# Patient Record
Sex: MALE | Race: WHITE | NOT HISPANIC OR LATINO | Employment: FULL TIME | URBAN - METROPOLITAN AREA
[De-identification: names, ages, dates, MRNs, and addresses within clinical notes are randomized per-mention and may not be internally consistent; named-entity substitution may affect disease eponyms.]

---

## 2022-04-12 ENCOUNTER — OFFICE VISIT (OUTPATIENT)
Dept: SURGERY | Facility: CLINIC | Age: 57
End: 2022-04-12
Payer: MEDICAID

## 2022-04-12 VITALS
WEIGHT: 221.69 LBS | BODY MASS INDEX: 28.45 KG/M2 | SYSTOLIC BLOOD PRESSURE: 149 MMHG | HEART RATE: 69 BPM | HEIGHT: 74 IN | DIASTOLIC BLOOD PRESSURE: 83 MMHG

## 2022-04-12 DIAGNOSIS — K40.90 RIGHT INGUINAL HERNIA: ICD-10-CM

## 2022-04-12 DIAGNOSIS — Z01.818 PRE-OP TESTING: ICD-10-CM

## 2022-04-12 PROCEDURE — 99203 OFFICE O/P NEW LOW 30 MIN: CPT | Mod: S$PBB,,, | Performed by: SURGERY

## 2022-04-12 PROCEDURE — 1160F RVW MEDS BY RX/DR IN RCRD: CPT | Mod: CPTII,,, | Performed by: SURGERY

## 2022-04-12 PROCEDURE — 99203 OFFICE O/P NEW LOW 30 MIN: CPT | Mod: PBBFAC | Performed by: SURGERY

## 2022-04-12 PROCEDURE — 3079F DIAST BP 80-89 MM HG: CPT | Mod: CPTII,,, | Performed by: SURGERY

## 2022-04-12 PROCEDURE — 3077F PR MOST RECENT SYSTOLIC BLOOD PRESSURE >= 140 MM HG: ICD-10-PCS | Mod: CPTII,,, | Performed by: SURGERY

## 2022-04-12 PROCEDURE — 3008F BODY MASS INDEX DOCD: CPT | Mod: CPTII,,, | Performed by: SURGERY

## 2022-04-12 PROCEDURE — 99999 PR PBB SHADOW E&M-NEW PATIENT-LVL III: CPT | Mod: PBBFAC,,, | Performed by: SURGERY

## 2022-04-12 PROCEDURE — 1159F PR MEDICATION LIST DOCUMENTED IN MEDICAL RECORD: ICD-10-PCS | Mod: CPTII,,, | Performed by: SURGERY

## 2022-04-12 PROCEDURE — 3079F PR MOST RECENT DIASTOLIC BLOOD PRESSURE 80-89 MM HG: ICD-10-PCS | Mod: CPTII,,, | Performed by: SURGERY

## 2022-04-12 PROCEDURE — 3008F PR BODY MASS INDEX (BMI) DOCUMENTED: ICD-10-PCS | Mod: CPTII,,, | Performed by: SURGERY

## 2022-04-12 PROCEDURE — 99999 PR PBB SHADOW E&M-NEW PATIENT-LVL III: ICD-10-PCS | Mod: PBBFAC,,, | Performed by: SURGERY

## 2022-04-12 PROCEDURE — 99203 PR OFFICE/OUTPT VISIT, NEW, LEVL III, 30-44 MIN: ICD-10-PCS | Mod: S$PBB,,, | Performed by: SURGERY

## 2022-04-12 PROCEDURE — 1159F MED LIST DOCD IN RCRD: CPT | Mod: CPTII,,, | Performed by: SURGERY

## 2022-04-12 PROCEDURE — 3077F SYST BP >= 140 MM HG: CPT | Mod: CPTII,,, | Performed by: SURGERY

## 2022-04-12 PROCEDURE — 1160F PR REVIEW ALL MEDS BY PRESCRIBER/CLIN PHARMACIST DOCUMENTED: ICD-10-PCS | Mod: CPTII,,, | Performed by: SURGERY

## 2022-04-12 NOTE — PROGRESS NOTES
"History & Physical    SUBJECTIVE:     History of Present Illness:  Patient is a 56 y.o. male presents with [facility] s/p fitz mcdowell with mesh 2013 by me.  He noticed it about 8 months ago after a sneezing episode.  He has symptoms when he sits and the area is squeezed-it is a discomfort.    He paints cars.    No chief complaint on file.      Review of patient's allergies indicates:   Allergen Reactions    Hydrocodone-acetaminophen Swelling     Swelling of arms and legs   rash  Swelling of arms and legs   rash       No current outpatient medications on file.     No current facility-administered medications for this visit.       No past medical history on file.  Past Surgical History:   Procedure Laterality Date    HERNIA REPAIR       Family History   Problem Relation Age of Onset    Heart disease Father     Diabetes Father     Heart disease Paternal Uncle      Social History     Tobacco Use    Smoking status: Current Every Day Smoker     Packs/day: 0.50    Smokeless tobacco: Never Used   Substance Use Topics    Alcohol use: Yes     Comment: drinks with fishing on the weekends    Drug use: No        Review of Systems:  Review of Systems   Constitutional: Negative for fever.   Respiratory: Negative for cough, chest tightness and shortness of breath.    Cardiovascular: Negative for chest pain.   Gastrointestinal: Negative for constipation, diarrhea, nausea and vomiting.   Genitourinary: Negative for difficulty urinating.   Hematological: Does not bruise/bleed easily.       OBJECTIVE:     Vital Signs (Most Recent)  Pulse: 69 (04/12/22 0912)  BP: (!) 149/83 (04/12/22 0912)  6' 2" (1.88 m)  100.5 kg (221 lb 10.8 oz)     Physical Exam:  Physical Exam  Vitals reviewed.   Constitutional:       Appearance: Normal appearance.   Abdominal:      Hernia: A hernia is present. Hernia is present in the right inguinal area.   Genitourinary:     Testes: Normal.       Neurological:      General: No focal deficit present.      Mental " Status: He is alert and oriented to person, place, and time.   Psychiatric:         Mood and Affect: Mood normal.         Behavior: Behavior normal.         Thought Content: Thought content normal.         Judgment: Judgment normal.         Laboratory  CBC: Reviewed  BMP: Reviewed  CMP: Reviewed  basically ok    Diagnostic Results:  Ct 2022 reviewed, rih with bladder  Stress and ekg 2021 reviewed, ok    ASSESSMENT/PLAN:     S/p lih years ago and now symptomatic rih.    PLAN:       Open rih with mesh.  Likely there is no light duty at work and he will be out of work for 6 weeks.

## 2022-05-10 ENCOUNTER — LAB VISIT (OUTPATIENT)
Dept: SURGERY | Facility: CLINIC | Age: 57
End: 2022-05-10
Payer: COMMERCIAL

## 2022-05-10 DIAGNOSIS — Z01.818 PRE-OP TESTING: ICD-10-CM

## 2022-05-10 LAB
SARS-COV-2 RNA RESP QL NAA+PROBE: NOT DETECTED
SARS-COV-2- CYCLE NUMBER: NORMAL

## 2022-05-10 PROCEDURE — U0003 INFECTIOUS AGENT DETECTION BY NUCLEIC ACID (DNA OR RNA); SEVERE ACUTE RESPIRATORY SYNDROME CORONAVIRUS 2 (SARS-COV-2) (CORONAVIRUS DISEASE [COVID-19]), AMPLIFIED PROBE TECHNIQUE, MAKING USE OF HIGH THROUGHPUT TECHNOLOGIES AS DESCRIBED BY CMS-2020-01-R: HCPCS | Performed by: SURGERY

## 2022-05-10 PROCEDURE — U0005 INFEC AGEN DETEC AMPLI PROBE: HCPCS | Performed by: SURGERY

## 2022-05-12 ENCOUNTER — TELEPHONE (OUTPATIENT)
Dept: SURGERY | Facility: CLINIC | Age: 57
End: 2022-05-12
Payer: COMMERCIAL

## 2022-05-12 NOTE — PRE-PROCEDURE INSTRUCTIONS
PreOp Instructions given:   - Verbal medication information (what to hold and what to take)   - NPO guidelines   - Arrival place directions given; time to be given the day before procedure by the   Surgeon's Office [id number] DOS  - Bathing with antibacterial soap   - Don't wear any jewelry or bring any valuables AM of surgery   - No makeup or moisturizer to face   - No perfume/cologne, powder, lotions or aftershave   Pt. verbalized understanding.   Pt denies any h/o Anesthesia/Sedation complications or side effects.

## 2022-05-13 ENCOUNTER — HOSPITAL ENCOUNTER (OUTPATIENT)
Facility: HOSPITAL | Age: 57
Discharge: HOME | End: 2022-05-13
Attending: SURGERY | Admitting: SURGERY
Payer: COMMERCIAL

## 2022-05-13 ENCOUNTER — ANESTHESIA (OUTPATIENT)
Dept: SURGERY | Facility: HOSPITAL | Age: 57
End: 2022-05-13
Payer: COMMERCIAL

## 2022-05-13 ENCOUNTER — ANESTHESIA EVENT (OUTPATIENT)
Dept: SURGERY | Facility: HOSPITAL | Age: 57
End: 2022-05-13
Payer: COMMERCIAL

## 2022-05-13 VITALS
OXYGEN SATURATION: 99 % | DIASTOLIC BLOOD PRESSURE: 74 MMHG | WEIGHT: 220 LBS | BODY MASS INDEX: 28.23 KG/M2 | TEMPERATURE: 98 F | RESPIRATION RATE: 21 BRPM | SYSTOLIC BLOOD PRESSURE: 117 MMHG | HEART RATE: 42 BPM | HEIGHT: 74 IN

## 2022-05-13 DIAGNOSIS — K40.90 INGUINAL HERNIA: ICD-10-CM

## 2022-05-13 DIAGNOSIS — K40.90 RIGHT INGUINAL HERNIA: Primary | ICD-10-CM

## 2022-05-13 PROCEDURE — D9220A PRA ANESTHESIA: Mod: ,,, | Performed by: ANESTHESIOLOGY

## 2022-05-13 PROCEDURE — 00830 ANES HERNIA RPR LWR ABD NOS: CPT | Performed by: SURGERY

## 2022-05-13 PROCEDURE — 71000015 HC POSTOP RECOV 1ST HR: Performed by: SURGERY

## 2022-05-13 PROCEDURE — 37000009 HC ANESTHESIA EA ADD 15 MINS: Performed by: SURGERY

## 2022-05-13 PROCEDURE — 64450: ICD-10-PCS | Mod: 59,RT,, | Performed by: ANESTHESIOLOGY

## 2022-05-13 PROCEDURE — 37000008 HC ANESTHESIA 1ST 15 MINUTES: Performed by: SURGERY

## 2022-05-13 PROCEDURE — 36000707: Performed by: SURGERY

## 2022-05-13 PROCEDURE — 49505 PRP I/HERN INIT REDUC >5 YR: CPT | Mod: RT,,, | Performed by: SURGERY

## 2022-05-13 PROCEDURE — 25000003 PHARM REV CODE 250: Performed by: SURGERY

## 2022-05-13 PROCEDURE — 63600175 PHARM REV CODE 636 W HCPCS: Performed by: STUDENT IN AN ORGANIZED HEALTH CARE EDUCATION/TRAINING PROGRAM

## 2022-05-13 PROCEDURE — 76942 ECHO GUIDE FOR BIOPSY: CPT | Mod: 26,,, | Performed by: ANESTHESIOLOGY

## 2022-05-13 PROCEDURE — 25000003 PHARM REV CODE 250: Performed by: STUDENT IN AN ORGANIZED HEALTH CARE EDUCATION/TRAINING PROGRAM

## 2022-05-13 PROCEDURE — C1729 CATH, DRAINAGE: HCPCS | Performed by: SURGERY

## 2022-05-13 PROCEDURE — 49505 PR REPAIR ING HERNIA,5+Y/O,REDUCIBL: ICD-10-PCS | Mod: RT,,, | Performed by: SURGERY

## 2022-05-13 PROCEDURE — 64450 NJX AA&/STRD OTHER PN/BRANCH: CPT | Performed by: ANESTHESIOLOGY

## 2022-05-13 PROCEDURE — D9220A PRA ANESTHESIA: ICD-10-PCS | Mod: ,,, | Performed by: ANESTHESIOLOGY

## 2022-05-13 PROCEDURE — 71000044 HC DOSC ROUTINE RECOVERY FIRST HOUR: Performed by: SURGERY

## 2022-05-13 PROCEDURE — 76942: ICD-10-PCS | Mod: 26,,, | Performed by: ANESTHESIOLOGY

## 2022-05-13 PROCEDURE — 36000706: Performed by: SURGERY

## 2022-05-13 PROCEDURE — C1781 MESH (IMPLANTABLE): HCPCS | Performed by: SURGERY

## 2022-05-13 PROCEDURE — 63600175 PHARM REV CODE 636 W HCPCS: Performed by: ANESTHESIOLOGY

## 2022-05-13 PROCEDURE — 25000003 PHARM REV CODE 250: Performed by: ANESTHESIOLOGY

## 2022-05-13 DEVICE — MESH HERNIA GROIN KIT 2.4X5.4: Type: IMPLANTABLE DEVICE | Site: INGUINAL | Status: FUNCTIONAL

## 2022-05-13 RX ORDER — ONDANSETRON 2 MG/ML
4 INJECTION INTRAMUSCULAR; INTRAVENOUS DAILY PRN
Status: DISCONTINUED | OUTPATIENT
Start: 2022-05-13 | End: 2022-05-13 | Stop reason: HOSPADM

## 2022-05-13 RX ORDER — DEXAMETHASONE SODIUM PHOSPHATE 4 MG/ML
INJECTION, SOLUTION INTRA-ARTICULAR; INTRALESIONAL; INTRAMUSCULAR; INTRAVENOUS; SOFT TISSUE
Status: DISCONTINUED | OUTPATIENT
Start: 2022-05-13 | End: 2022-05-13

## 2022-05-13 RX ORDER — BUPIVACAINE HYDROCHLORIDE AND EPINEPHRINE 5; 5 MG/ML; UG/ML
INJECTION, SOLUTION EPIDURAL; INTRACAUDAL; PERINEURAL
Status: DISCONTINUED | OUTPATIENT
Start: 2022-05-13 | End: 2022-05-13 | Stop reason: HOSPADM

## 2022-05-13 RX ORDER — BUPIVACAINE HYDROCHLORIDE 7.5 MG/ML
INJECTION, SOLUTION EPIDURAL; RETROBULBAR
Status: COMPLETED | OUTPATIENT
Start: 2022-05-13 | End: 2022-05-13

## 2022-05-13 RX ORDER — PROPOFOL 10 MG/ML
VIAL (ML) INTRAVENOUS
Status: DISCONTINUED | OUTPATIENT
Start: 2022-05-13 | End: 2022-05-13

## 2022-05-13 RX ORDER — PROCHLORPERAZINE EDISYLATE 5 MG/ML
5 INJECTION INTRAMUSCULAR; INTRAVENOUS EVERY 30 MIN PRN
Status: DISCONTINUED | OUTPATIENT
Start: 2022-05-13 | End: 2022-05-13 | Stop reason: HOSPADM

## 2022-05-13 RX ORDER — SODIUM CHLORIDE 0.9 % (FLUSH) 0.9 %
10 SYRINGE (ML) INJECTION
Status: DISCONTINUED | OUTPATIENT
Start: 2022-05-13 | End: 2022-05-13 | Stop reason: HOSPADM

## 2022-05-13 RX ORDER — NEOSTIGMINE METHYLSULFATE 0.5 MG/ML
INJECTION, SOLUTION INTRAVENOUS
Status: DISCONTINUED | OUTPATIENT
Start: 2022-05-13 | End: 2022-05-13

## 2022-05-13 RX ORDER — ROCURONIUM BROMIDE 10 MG/ML
INJECTION, SOLUTION INTRAVENOUS
Status: DISCONTINUED | OUTPATIENT
Start: 2022-05-13 | End: 2022-05-13

## 2022-05-13 RX ORDER — SODIUM CHLORIDE 9 MG/ML
INJECTION, SOLUTION INTRAVENOUS CONTINUOUS
Status: DISCONTINUED | OUTPATIENT
Start: 2022-05-13 | End: 2022-05-13 | Stop reason: HOSPADM

## 2022-05-13 RX ORDER — LIDOCAINE HYDROCHLORIDE 10 MG/ML
1 INJECTION, SOLUTION EPIDURAL; INFILTRATION; INTRACAUDAL; PERINEURAL ONCE
Status: DISCONTINUED | OUTPATIENT
Start: 2022-05-13 | End: 2022-05-13 | Stop reason: HOSPADM

## 2022-05-13 RX ORDER — OXYCODONE HYDROCHLORIDE 5 MG/1
5 TABLET ORAL ONCE AS NEEDED
Status: COMPLETED | OUTPATIENT
Start: 2022-05-13 | End: 2022-05-13

## 2022-05-13 RX ORDER — LIDOCAINE HYDROCHLORIDE 20 MG/ML
INJECTION, SOLUTION EPIDURAL; INFILTRATION; INTRACAUDAL; PERINEURAL
Status: DISCONTINUED | OUTPATIENT
Start: 2022-05-13 | End: 2022-05-13

## 2022-05-13 RX ORDER — FENTANYL CITRATE 50 UG/ML
25 INJECTION, SOLUTION INTRAMUSCULAR; INTRAVENOUS EVERY 5 MIN PRN
Status: DISCONTINUED | OUTPATIENT
Start: 2022-05-13 | End: 2022-05-13 | Stop reason: HOSPADM

## 2022-05-13 RX ORDER — KETAMINE HCL IN 0.9 % NACL 50 MG/5 ML
SYRINGE (ML) INTRAVENOUS
Status: DISCONTINUED | OUTPATIENT
Start: 2022-05-13 | End: 2022-05-13

## 2022-05-13 RX ORDER — OXYCODONE HYDROCHLORIDE 5 MG/1
5 TABLET ORAL EVERY 4 HOURS PRN
Qty: 15 TABLET | Refills: 0 | Status: SHIPPED | OUTPATIENT
Start: 2022-05-13 | End: 2022-05-16 | Stop reason: SDUPTHER

## 2022-05-13 RX ORDER — CEFAZOLIN SODIUM/WATER 2 G/20 ML
2 SYRINGE (ML) INTRAVENOUS
Status: DISCONTINUED | OUTPATIENT
Start: 2022-05-13 | End: 2022-05-13 | Stop reason: HOSPADM

## 2022-05-13 RX ORDER — MIDAZOLAM HYDROCHLORIDE 1 MG/ML
0.5 INJECTION INTRAMUSCULAR; INTRAVENOUS
Status: DISCONTINUED | OUTPATIENT
Start: 2022-05-13 | End: 2022-05-13 | Stop reason: HOSPADM

## 2022-05-13 RX ORDER — ACETAMINOPHEN 500 MG
1000 TABLET ORAL
Status: COMPLETED | OUTPATIENT
Start: 2022-05-13 | End: 2022-05-13

## 2022-05-13 RX ORDER — ONDANSETRON 2 MG/ML
INJECTION INTRAMUSCULAR; INTRAVENOUS
Status: DISCONTINUED | OUTPATIENT
Start: 2022-05-13 | End: 2022-05-13

## 2022-05-13 RX ADMIN — NEOSTIGMINE METHYLSULFATE 5 MG: 0.5 INJECTION INTRAVENOUS at 09:05

## 2022-05-13 RX ADMIN — ONDANSETRON HYDROCHLORIDE 4 MG: 2 INJECTION INTRAMUSCULAR; INTRAVENOUS at 08:05

## 2022-05-13 RX ADMIN — LIDOCAINE HYDROCHLORIDE 100 MG: 20 INJECTION, SOLUTION EPIDURAL; INFILTRATION; INTRACAUDAL; PERINEURAL at 08:05

## 2022-05-13 RX ADMIN — OXYCODONE 5 MG: 5 TABLET ORAL at 10:05

## 2022-05-13 RX ADMIN — ACETAMINOPHEN 1000 MG: 500 TABLET ORAL at 07:05

## 2022-05-13 RX ADMIN — GLYCOPYRROLATE 0.6 MG: 0.2 INJECTION, SOLUTION INTRAMUSCULAR; INTRAVITREAL at 09:05

## 2022-05-13 RX ADMIN — SODIUM CHLORIDE: 0.9 INJECTION, SOLUTION INTRAVENOUS at 08:05

## 2022-05-13 RX ADMIN — Medication 30 MG: at 08:05

## 2022-05-13 RX ADMIN — Medication 2 G: at 08:05

## 2022-05-13 RX ADMIN — Medication 20 MG: at 09:05

## 2022-05-13 RX ADMIN — BUPIVACAINE HYDROCHLORIDE 15 ML: 7.5 INJECTION, SOLUTION EPIDURAL; RETROBULBAR at 08:05

## 2022-05-13 RX ADMIN — DEXAMETHASONE SODIUM PHOSPHATE 4 MG: 4 INJECTION, SOLUTION INTRAMUSCULAR; INTRAVENOUS at 08:05

## 2022-05-13 RX ADMIN — PROPOFOL 50 MG: 10 INJECTION, EMULSION INTRAVENOUS at 09:05

## 2022-05-13 RX ADMIN — PROPOFOL 200 MG: 10 INJECTION, EMULSION INTRAVENOUS at 08:05

## 2022-05-13 RX ADMIN — MIDAZOLAM 2 MG: 1 INJECTION INTRAMUSCULAR; INTRAVENOUS at 08:05

## 2022-05-13 RX ADMIN — ROCURONIUM BROMIDE 50 MG: 10 INJECTION, SOLUTION INTRAVENOUS at 08:05

## 2022-05-13 NOTE — BRIEF OP NOTE
Operative Note       Surgery Date: 5/13/2022     Surgeon(s) and Role:     * Dheeraj Romero MD - Primary     * Jonah Shah MD - Resident - Assisting    Pre-op Diagnosis:  Right inguinal hernia [K40.90]    Post-op Diagnosis:  Right inguinal hernia [K40.90]    Procedure(s) (LRB):  REPAIR, HERNIA, INGUINAL,OPEN, RIGHT w/ mesh (Right)    Anesthesia: General    Procedure in Detail/Findings:  Moderate hernia with direct, indirect and lipoma of cord components.     Estimated Blood Loss: Minimal           Specimens (From admission, onward)    None        Implants:   Implant Name Type Inv. Item Serial No.  Lot No. LRB No. Used Action   MESH HERNIA GROIN KIT 2.4X5.4 - [id number]  MESH HERNIA GROIN KIT 2.4X5.4  [employer] [id number] 1 Implanted              Disposition: PACU - hemodynamically stable.           Condition: Good    Attestation:  I was present and scrubbed for the entire procedure.

## 2022-05-13 NOTE — H&P
"History & Physical     SUBJECTIVE:      History of Present Illness:  Patient is a 56 y.o. male presents with [facility] s/p fitz mcdowell with mesh 2013 by me.  He noticed it about 8 months ago after a sneezing episode.  He has symptoms when he sits and the area is squeezed-it is a discomfort.     He paints cars.     No chief complaint on file.     Interval History 5/13/22: no interval changes, denies recent illnesses or hospitalizations           Review of patient's allergies indicates:   Allergen Reactions    Hydrocodone-acetaminophen Swelling       Swelling of arms and legs   rash  Swelling of arms and legs   rash         Current Medications   No current outpatient medications on file.      No current facility-administered medications for this visit.            No past medical history on file.        Past Surgical History:   Procedure Laterality Date    HERNIA REPAIR                Family History   Problem Relation Age of Onset    Heart disease Father      Diabetes Father      Heart disease Paternal Uncle        Social History            Tobacco Use    Smoking status: Current Every Day Smoker       Packs/day: 0.50    Smokeless tobacco: Never Used   Substance Use Topics    Alcohol use: Yes       Comment: drinks with fishing on the weekends    Drug use: No         Review of Systems:  Review of Systems   Constitutional: Negative for fever.   Respiratory: Negative for cough, chest tightness and shortness of breath.    Cardiovascular: Negative for chest pain.   Gastrointestinal: Negative for constipation, diarrhea, nausea and vomiting.   Genitourinary: Negative for difficulty urinating.   Hematological: Does not bruise/bleed easily.         OBJECTIVE:      Vital Signs (Most Recent)  Pulse: 69 (04/12/22 0912)  BP: (!) 149/83 (04/12/22 0912)  6' 2" (1.88 m)  100.5 kg (221 lb 10.8 oz)      Physical Exam:  Physical Exam  Vitals reviewed.   Constitutional:       Appearance: Normal appearance.   Abdominal:      Hernia: A hernia " is present. Hernia is present in the right inguinal area.   Genitourinary:     Testes: Normal.       Neurological:      General: No focal deficit present.      Mental Status: He is alert and oriented to person, place, and time.   Psychiatric:         Mood and Affect: Mood normal.         Behavior: Behavior normal.         Thought Content: Thought content normal.         Judgment: Judgment normal.            Laboratory  CBC: Reviewed  BMP: Reviewed  CMP: Reviewed  basically ok     Diagnostic Results:  Ct 2022 reviewed, rih with bladder  Stress and ekg 2021 reviewed, ok     ASSESSMENT/PLAN:      S/p lih years ago and now symptomatic rih.     PLAN:     - OR today for open RIH repair with mesh  - NPO  - Please call with questions

## 2022-05-13 NOTE — OP NOTE
DATE OF PROCEDURE: 05/13/2022     Surgeon(s) and Role:     * Dheeraj Romero MD - Primary     * Jonah Shah MD - Resident - Assisting    PREOPERATIVE DIAGNOSIS: Right inguinal hernia.     POSTOPERATIVE DIAGNOSIS: Right inguinal hernia.     PROCEDURE PERFORMED: Right inguinal hernia repair with mesh.     ANESTHESIA: General Anesthesia    FINDINGS: Moderate-sized right inguinal hernia repaired with polypropylene mesh.    SPECIMEN: None     DRAINS: None.     COMPLICATIONS: None.     INDICATIONS: Ernst Caldera is a 56 y.o.male referred to our General Surgery Clinic with a history of a symptomatic, reducible inguinal bulge. The history and exam were consistent with inguinal hernia. We recommended an open inguinal hernia repair with mesh and the patient agreed to proceed. The patient signed informed consent and expressed understanding of the risks and benefits of surgery.     DESCRIPTION OF OPERATIVE PROCEDURE: The patient was identified in preoperative holding and brought back to the Operating Room. The patient was placed supine on the operating table and padded appropriately. Monitors were applied and monitored anesthesia care was initiated. His groin was shaved with electric clippers   and prepped and draped in the standard sterile surgical fashion. A timeout was   performed and all team members present agreed this was the correct procedure on   the correct side of the correct patient. We also confirmed administration of   appropriate preoperative antibiotics.     We marked out an appropriate right inguinal incision and administered a mix of   lidocaine and Marcaine. After assuring adequate local anesthesia, a 5 cm   oblique skin incision was made. Subcutaneous tissue was divided with Bovie   electrocautery. This dissection was carried down through Viktor fascia down to   the aponeurosis of the external oblique. The aponeurosis of the external   oblique was incised in line with its fibers, first with a  scalpel and then   Metzenbaum scissors, and this incision was extended to the external spermatic   ring. The inguinal canal contents were bluntly encircled, first digitally and   then with a Penrose drain. Romero retractors were placed to facilitate the   dissection. We proceeded to identify a direct hernia defect and an indirect inguinal hernia sac,   which was carefully dissected away from the inguinal canal contents until it   could be reduced into the defect. We performed skeletonization of the spermatic cord. We then had appropriate borders of the inguinal canal identified and decided to repair the defect with a piece of polypropylene mesh. The mesh was cut to fit the defect appropriately and sewn in place with interrupted 2-0 Prolene suture. Medially, the mesh was anchored to the fascia overlying the pubic tubercle. Inferiorly, the mesh was anchored to the shelving edge of the inguinal ligament. Superiorly, the mesh was anchored to the conjoined tendon. The tails of the mesh were brought together around the cord structures creating a new internal ring that just admitted the tip of the   finger. The mesh was inspected and noted to lie in appropriate position without   any redundancy or tension. The testicle was pulled back down to the scrotum   and there was noted to be no tension on the cord structures. The aponeurosis of   the external oblique was closed with a running 3-0 Vicryl suture. Viktor   fascia was closed with several buried interrupted 3-0 Vicryl sutures and the   skin was closed with a running subcuticular Monocryl suture. A sterile dressing   was applied. The patient was then transported to the Recovery Room in stable   condition.  COMPLICATIONS: None.   SPONGE COUNT: Correct.   BLOOD LOSS: 15 mL.   FLUIDS: Per Anesthesia.   BLOOD GIVEN: None.   DRAINS: None.   SPECIMENS: None.   CONDITION OF PATIENT: Good.   Dr Romero was present for the entire procedure.

## 2022-05-13 NOTE — ANESTHESIA PROCEDURE NOTES
Quadratus Lumborum Single Shot Nerve Block    Patient location during procedure: pre-op   Block not for primary anesthetic.  Reason for block: at surgeon's request and post-op pain management   Post-op Pain Location: R abdominal pain   Start time: 5/13/2022 8:15 AM  Timeout: 5/13/2022 8:15 AM   End time: 5/13/2022 8:20 AM    Staffing  Authorizing Provider: Michael Cordero MD  Performing Provider: Michael Cordero MD    Preanesthetic Checklist  Completed: patient identified, IV checked, site marked, risks and benefits discussed, surgical consent, monitors and equipment checked, pre-op evaluation and timeout performed  Peripheral Block  Patient position: left lateral decubitus  Prep: ChloraPrep  Patient monitoring: heart rate, continuous pulse ox, continuous capnometry, frequent blood pressure checks and cardiac monitor  Block type: Quadratus Lumborum  Laterality: right  Injection technique: single shot  Needle  Needle type: Stimuplex   Needle gauge: 21 G  Needle length: 4 in  Needle localization: ultrasound guidance   -ultrasound image captured on disc.  Assessment  Injection assessment: negative aspiration, negative parasthesia and local visualized surrounding nerve  Paresthesia pain: none  Heart rate change: no  Slow fractionated injection: yes  Pain Tolerance: comfortable throughout block and no complaints  Medications:    Medications: bupivacaine (pf) (MARCAINE) injection 0.75% - Perineural   15 mL - 5/13/2022 8:20:00 AM    Additional Notes  Diluted 15 cc of 0.75% Bupivacaine to 30 cc of 0.375% Bupivacaine.  Added 1:300k epi.  VSS.  See nursing record for vitals and sedation documentation.

## 2022-05-13 NOTE — DISCHARGE INSTRUCTIONS
Surgery Post Op Instructions:    You had an open inguinal hernia repair performed today.     Wound care:  Your incision is covered with Dermabond, a type of surgical super glue. You may shower tomorrow - let soapy water run over the incision but do not scrub the area. You must avoid submerging the incision in pools, bathtubs, etc until it is fully healed in 2 weeks.     You need to limit yourself to light duty activities (no lifting/pulling/pushing >10 lbs) for a total of 6 weeks after surgery.    No dietary restrictions.    Medications:  A narcotic pain medication has been prescribed.  Please start to wean the pain medication over the following few days.  You can take tylenol instead of the pain medication.      Please take miralax 1 capful at night to prevent constipation associated with narcotic pain medications.      Follow up:  Return to clinic in 2 weeks for follow up and wound check.

## 2022-05-13 NOTE — PROGRESS NOTES
PreOp complete. Anesthesia consent, site jeannie,  and update needed. Belongings placed in the locker.

## 2022-05-13 NOTE — BRIEF OP NOTE
[facility] ([facility])  Brief Operative Note    Surgery Date: 5/13/2022     Surgeon(s) and Role:     * Dheeraj Romero MD - Primary     * Jonah Shah MD - Resident - Assisting        Pre-op Diagnosis:  Right inguinal hernia [K40.90]    Post-op Diagnosis:  Post-Op Diagnosis Codes:     * Right inguinal hernia [K40.90]    Procedure(s) (LRB):  REPAIR, HERNIA, INGUINAL,OPEN, RIGHT w/ mesh (Right)    Anesthesia: General    Operative Findings: Indirect and direct hernias. Open repair performed with mesh, right side.     Estimated Blood Loss: 5 mL         Specimens:   Specimen (24h ago, onward)            None            Discharge Note    OUTCOME: Patient tolerated treatment/procedure well without complication and is now ready for discharge.    DISPOSITION: Home or Self Care    FINAL DIAGNOSIS:  Right Inguinal Hernia    FOLLOWUP: In clinic    DISCHARGE INSTRUCTIONS:    Discharge Procedure Orders   Diet Adult Regular     Notify your health care provider if you experience any of the following:  temperature >100.4     Notify your health care provider if you experience any of the following:  persistent nausea and vomiting or diarrhea     Notify your health care provider if you experience any of the following:  severe uncontrolled pain     Notify your health care provider if you experience any of the following:  redness, tenderness, or signs of infection (pain, swelling, redness, odor or green/yellow discharge around incision site)     Notify your health care provider if you experience any of the following:  difficulty breathing or increased cough     Notify your health care provider if you experience any of the following:  severe persistent headache     Notify your health care provider if you experience any of the following:  persistent dizziness, light-headedness, or visual disturbances     Notify your health care provider if you experience any of the following:  increased confusion or weakness     Activity  as tolerated

## 2022-05-13 NOTE — TRANSFER OF CARE
"Anesthesia Transfer of Care Note    Patient: Ernst Caldera    Procedure(s) Performed: Procedure(s) (LRB):  REPAIR, HERNIA, INGUINAL,OPEN, RIGHT w/ mesh (Right)    Patient location: [facility]    Anesthesia Type: general    Transport from OR: Transported from OR on 6-10 L/min O2 by face mask with adequate spontaneous ventilation    Post pain: adequate analgesia    Post assessment: no apparent anesthetic complications    Post vital signs: stable    Level of consciousness: awake and alert    Nausea/Vomiting: no nausea/vomiting    Complications: none          Last vitals:   Visit Vitals  BP (!) 107/59 (BP Location: Right arm, Patient Position: Lying)   Pulse (!) 46   Temp 36.4 °C (97.5 °F) (Temporal)   Resp 18   Ht 6' 2" (1.88 m)   Wt 99.8 kg (220 lb)   SpO2 100%   BMI 28.25 kg/m²     "

## 2022-05-13 NOTE — ANESTHESIA PROCEDURE NOTES
Intubation    Date/Time: 5/13/2022 8:41 AM  Performed by: Frankie Isaac MD  Authorized by: Jose Rcihards MD     Intubation:     Induction:  Intravenous    Intubated:  Postinduction    Mask Ventilation:  Moderately difficult with oral airway    Attempts:  1    Attempted By:  Resident anesthesiologist    Method of Intubation:  Direct    Blade:  Giraldo 2    Laryngeal View Grade: Grade IIA - cords partially seen      Difficult Airway Encountered?: No      Complications:  None    Airway Device:  Oral endotracheal tube    Airway Device Size:  8.0    Style/Cuff Inflation:  Cuffed    Inflation Amount (mL):  5    Tube secured:  24    Secured at:  The lips    Placement Verified By:  Capnometry    Complicating Factors:  None    Findings Post-Intubation:  BS equal bilateral and atraumatic/condition of teeth unchanged

## 2022-05-13 NOTE — ANESTHESIA PREPROCEDURE EVALUATION
[facility]  Anesthesia Pre-Operative Evaluation         Patient Name: Ernst Caldera  YOB: 1965  MRN: [id number]    SUBJECTIVE:     Pre-operative evaluation for Procedure(s) (LRB):  REPAIR, HERNIA, INGUINAL,OPEN, RIGHT w/ mesh (Right)     05/13/2022    Ernst Caldera is a 56 y.o. male w/ no significant PMHx who presents with an inguinal hernia.     Patient now presents for the above procedure(s).    Prev airway: None documented.    Patient Active Problem List   Diagnosis    Right inguinal hernia       Review of patient's allergies indicates:   Allergen Reactions    Hydrocodone-acetaminophen Swelling     Swelling of arms and legs   rash  Swelling of arms and legs   rash    Codeine Rash       Current Inpatient Medications:      No current facility-administered medications on file prior to encounter.     No current outpatient medications on file prior to encounter.       Past Surgical History:   Procedure Laterality Date    HERNIA REPAIR         OBJECTIVE:     Vital Signs Range (Last 24H):         Significant Labs:  Lab Results   Component Value Date    WBC 5.12 11/11/2013    HGB 16.4 11/11/2013    HCT 49.2 11/11/2013     11/11/2013     11/11/2013    K 4.5 11/11/2013     11/11/2013    CREATININE 1.0 11/11/2013    BUN 18 11/11/2013    CO2 24 11/11/2013       Diagnostic Studies: No relevant studies.    EKG:   No results found for this or any previous visit.      ASSESSMENT/PLAN:       Pre-op Assessment    I have reviewed the Patient Summary Reports.     I have reviewed the Nursing Notes. I have reviewed the NPO Status.   I have reviewed the Medications.     Review of Systems  Anesthesia Hx:  No previous Anesthesia  History of prior surgery of interest to airway management or planning: Previous anesthesia: General Denies Family Hx of Anesthesia complications.   Denies Personal Hx of Anesthesia complications.   EENT/Dental:EENT/Dental Normal    Cardiovascular:  Cardiovascular Normal     Pulmonary:  Pulmonary Normal    Renal/:  Renal/ Normal     Hepatic/GI:  Hepatic/GI Normal    Neurological:  Neurology Normal    Endocrine:  Endocrine Normal        Physical Exam  General: Well nourished and Cooperative    Airway:  Mouth Opening: Normal  Neck ROM: Normal ROM    Dental:  Intact    Chest/Lungs:  Clear to auscultation, Normal Respiratory Rate    Heart:  Rate: Normal  Rhythm: Regular Rhythm        Anesthesia Plan  Type of Anesthesia, risks & benefits discussed:    Anesthesia Type: Gen ETT  Intra-op Monitoring Plan: Standard ASA Monitors  Post Op Pain Control Plan: multimodal analgesia and IV/PO Opioids PRN  Airway Plan: Direct, Post-Induction  ASA Score: 1  Day of Surgery Review of History & Physical: H&P Update referred to the surgeon/provider.    Ready For Surgery From Anesthesia Perspective.     .

## 2022-05-16 ENCOUNTER — PATIENT MESSAGE (OUTPATIENT)
Dept: SURGERY | Facility: CLINIC | Age: 57
End: 2022-05-16
Payer: COMMERCIAL

## 2022-05-16 DIAGNOSIS — K40.90 RIGHT INGUINAL HERNIA: Primary | ICD-10-CM

## 2022-05-16 RX ORDER — OXYCODONE HYDROCHLORIDE 5 MG/1
5 TABLET ORAL EVERY 4 HOURS PRN
Qty: 15 TABLET | Refills: 0 | Status: SHIPPED | OUTPATIENT
Start: 2022-05-16 | End: 2022-05-17 | Stop reason: SDUPTHER

## 2022-05-16 NOTE — ANESTHESIA POSTPROCEDURE EVALUATION
Anesthesia Post Evaluation    Patient: Ernst Caldera    Procedure(s) Performed: Procedure(s) (LRB):  REPAIR, HERNIA, INGUINAL,OPEN, RIGHT w/ mesh (Right)    Final Anesthesia Type: general      Patient location during evaluation: [facility]  Patient participation: Yes- Able to Participate  Level of consciousness: awake and alert and oriented  Post-procedure vital signs: reviewed and stable  Pain management: adequate  Airway patency: patent    PONV status at discharge: No PONV  Anesthetic complications: no      Cardiovascular status: hemodynamically stable  Respiratory status: unassisted, spontaneous ventilation and room air  Hydration status: euvolemic  Follow-up not needed.          Vitals Value Taken Time   /74 05/13/22 1046   Temp 36.8 °C (98.2 °F) 05/13/22 1045   Pulse 46 05/13/22 1049   Resp 37 05/13/22 1049   SpO2 98 % 05/13/22 1049   Vitals shown include unvalidated device data.      No case tracking events are documented in the log.      Pain/Adsh Score: No data recorded

## 2022-05-16 NOTE — PROGRESS NOTES
Patient having lots of pain after surgery. Was given a prescription for Oxycodone. Patient feels like he may need a few more pills. He called the doctor's office but has not gotten a replay. I encouraged patient to call the doctor's office again or send a message on [facility].

## 2022-05-16 NOTE — TELEPHONE ENCOUNTER
----- Message from Jhonathan Parisi sent at 5/16/2022  9:09 AM CDT -----  Regarding: Refill Request  Please refill the medication(s) listed below :     Please send to [facility] [place] [id number] !         Medication # 1 : oxyCODONE (ROXICODONE) 5 MG immediate release tablet       Please call the patient when the prescription is sent to pharmacy : [phone]         Can the clinic reply in [facility] : No         Preferred Pharmacy : [facility] [place] [address]

## 2022-05-17 DIAGNOSIS — K40.90 RIGHT INGUINAL HERNIA: ICD-10-CM

## 2022-05-17 RX ORDER — OXYCODONE HYDROCHLORIDE 5 MG/1
5 TABLET ORAL EVERY 4 HOURS PRN
Qty: 10 TABLET | Refills: 0 | Status: SHIPPED | OUTPATIENT
Start: 2022-05-17 | End: 2022-05-31

## 2022-05-17 RX ORDER — OXYCODONE HYDROCHLORIDE 5 MG/1
5 TABLET ORAL EVERY 4 HOURS PRN
Qty: 10 TABLET | Refills: 0 | Status: SHIPPED | OUTPATIENT
Start: 2022-05-17 | End: 2022-05-17 | Stop reason: SDUPTHER

## 2022-05-31 ENCOUNTER — OFFICE VISIT (OUTPATIENT)
Dept: SURGERY | Facility: CLINIC | Age: 57
End: 2022-05-31
Payer: COMMERCIAL

## 2022-05-31 VITALS
BODY MASS INDEX: 28.58 KG/M2 | HEART RATE: 63 BPM | HEIGHT: 74 IN | DIASTOLIC BLOOD PRESSURE: 85 MMHG | SYSTOLIC BLOOD PRESSURE: 139 MMHG | WEIGHT: 222.69 LBS

## 2022-05-31 DIAGNOSIS — Z09 POSTOP CHECK: Primary | ICD-10-CM

## 2022-05-31 PROBLEM — K40.90 RIGHT INGUINAL HERNIA: Status: RESOLVED | Noted: 2022-04-12 | Resolved: 2022-05-31

## 2022-05-31 PROCEDURE — 1159F PR MEDICATION LIST DOCUMENTED IN MEDICAL RECORD: ICD-10-PCS | Mod: CPTII,S$GLB,, | Performed by: SURGERY

## 2022-05-31 PROCEDURE — 99024 POSTOP FOLLOW-UP VISIT: CPT | Mod: S$GLB,,, | Performed by: SURGERY

## 2022-05-31 PROCEDURE — 3008F BODY MASS INDEX DOCD: CPT | Mod: CPTII,S$GLB,, | Performed by: SURGERY

## 2022-05-31 PROCEDURE — 3008F PR BODY MASS INDEX (BMI) DOCUMENTED: ICD-10-PCS | Mod: CPTII,S$GLB,, | Performed by: SURGERY

## 2022-05-31 PROCEDURE — 99024 PR POST-OP FOLLOW-UP VISIT: ICD-10-PCS | Mod: S$GLB,,, | Performed by: SURGERY

## 2022-05-31 PROCEDURE — 3075F PR MOST RECENT SYSTOLIC BLOOD PRESS GE 130-139MM HG: ICD-10-PCS | Mod: CPTII,S$GLB,, | Performed by: SURGERY

## 2022-05-31 PROCEDURE — 3079F PR MOST RECENT DIASTOLIC BLOOD PRESSURE 80-89 MM HG: ICD-10-PCS | Mod: CPTII,S$GLB,, | Performed by: SURGERY

## 2022-05-31 PROCEDURE — 1160F RVW MEDS BY RX/DR IN RCRD: CPT | Mod: CPTII,S$GLB,, | Performed by: SURGERY

## 2022-05-31 PROCEDURE — 3075F SYST BP GE 130 - 139MM HG: CPT | Mod: CPTII,S$GLB,, | Performed by: SURGERY

## 2022-05-31 PROCEDURE — 3079F DIAST BP 80-89 MM HG: CPT | Mod: CPTII,S$GLB,, | Performed by: SURGERY

## 2022-05-31 PROCEDURE — 1160F PR REVIEW ALL MEDS BY PRESCRIBER/CLIN PHARMACIST DOCUMENTED: ICD-10-PCS | Mod: CPTII,S$GLB,, | Performed by: SURGERY

## 2022-05-31 PROCEDURE — 1159F MED LIST DOCD IN RCRD: CPT | Mod: CPTII,S$GLB,, | Performed by: SURGERY

## 2022-05-31 PROCEDURE — 99999 PR PBB SHADOW E&M-EST. PATIENT-LVL III: CPT | Mod: PBBFAC,,, | Performed by: SURGERY

## 2022-05-31 PROCEDURE — 99999 PR PBB SHADOW E&M-EST. PATIENT-LVL III: ICD-10-PCS | Mod: PBBFAC,,, | Performed by: SURGERY

## 2022-05-31 PROCEDURE — 99213 OFFICE O/P EST LOW 20 MIN: CPT | Mod: PBBFAC | Performed by: SURGERY

## 2022-05-31 NOTE — PROGRESS NOTES
I have seen the patient, reviewed the Resident's history and physical, assessment and plan. I have personally interviewed and examined the patient at bedside and: agree with the findings.     S/p open rih with mesh 5/13/22.  He has right groin pain that is improving and otherwise has no complaints.  He is using some ice on the area.  Wound clear, no hernias.  Light duty one more month and follow up prn.  Out of work 2 more weeks.

## 2022-05-31 NOTE — PROGRESS NOTES
[facility]  Post Op Visit    SUBJECTIVE:  Ernst Caldera is a 57 y.o. y/o male who presents to clinic today for post op follow up after open RIH repair on 05/13/22.  He denies pain, fevers, chills, nausea, vomiting, diarrhea, constipation, or hematochezia. Returning to usual activity level. Tolerating diet with normal appetite and bowel function. Denies redness around or drainage from incisions. Complains of some right groin pain exacerbated by movement, but states that this has been improving over time.     OBJECTIVE:  Vitals:    05/31/22 0934   BP: 139/85   Pulse: 63       General: male in NAD. Not toxic appearing.   HENT: EOM intact.   Pulmonary: No respiratory distress. Effort normal.  Cardiovascular: Regular rate.   Abdomen: soft, non-tender, non-distended  Skin: Incision is healing well without signs of infection. No erythema, drainage, or increased warmth noted.       ASSESSMENT/PLAN:    Ernst Caldera is a 57 y.o. y/o male who presents to clinic today for f/u s/p open RIH on 5/13/22. Clinically improving and meeting all post op milestones     Patient is advised to avoid heavy lifting or strenuous activity for another 4 weeks.   Follow-up PRN.   Continue any current medications   Call clinic with any questions or concerns  ED precautions given.   All questions answered; patient is comfortable with the above follow-up plan and verbalized understanding.

## 2022-05-31 NOTE — LETTER
[facility] [address]  [address]  [facility] [address]  Phone: [phone]               May 31, 2022      Patient: Ernst Caldera   MR Number: [id number]   YOB: 1965   Date of Visit: 5/31/2022     Silvana Baeza MD  [address] [facility] [address]    Dear Dr. Baeza:    Thank you for referring Ernst Caldera to me for evaluation. Attached you will find relevant portions of my assessment and plan of care.    If you have questions, please do not hesitate to call me. I look forward to following Ernst Caldera along with you.      Sincerely,          Dheeraj Romero MD        Enclosure

## (undated) DEVICE — DRESSING TRANS 4X4 TEGADERM

## (undated) DEVICE — NDL HYPO REG 25G X 1 1/2

## (undated) DEVICE — ADHESIVE DERMABOND MINI HV

## (undated) DEVICE — SEE MEDLINE ITEM 157148

## (undated) DEVICE — ADHESIVE MASTISOL VIAL 48/BX

## (undated) DEVICE — SUT PROLENE 0 MO6 30IN BLUE

## (undated) DEVICE — APPLICATOR CHLORAPREP ORN 26ML

## (undated) DEVICE — SUT MONOCYRL 4-0 PS2 UND

## (undated) DEVICE — DRESSING TELFA STRL 4X3 LF

## (undated) DEVICE — SUT 2-0 VICRYL / SH (J417)

## (undated) DEVICE — TUBE PENROSE DRAIN 12IN X 5/8I

## (undated) DEVICE — DRAIN PENROSE XRAY 12 X 1/4 ST

## (undated) DEVICE — SEE MEDLINE ITEM 157117

## (undated) DEVICE — GOWN SURGICAL X-LARGE

## (undated) DEVICE — TRAY MINOR GEN SURG